# Patient Record
Sex: FEMALE | Race: AMERICAN INDIAN OR ALASKA NATIVE | NOT HISPANIC OR LATINO | Employment: UNEMPLOYED | URBAN - METROPOLITAN AREA
[De-identification: names, ages, dates, MRNs, and addresses within clinical notes are randomized per-mention and may not be internally consistent; named-entity substitution may affect disease eponyms.]

---
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
## 2025-01-07 ENCOUNTER — OFFICE VISIT (OUTPATIENT)
Dept: SURGERY | Facility: CLINIC | Age: 20
End: 2025-01-07
Payer: COMMERCIAL

## 2025-01-07 VITALS
DIASTOLIC BLOOD PRESSURE: 72 MMHG | HEIGHT: 64 IN | WEIGHT: 261.1 LBS | SYSTOLIC BLOOD PRESSURE: 118 MMHG | BODY MASS INDEX: 44.57 KG/M2

## 2025-01-07 DIAGNOSIS — E66.01 MORBID OBESITY (H): Primary | ICD-10-CM

## 2025-01-07 DIAGNOSIS — M54.9 BACK PAIN, UNSPECIFIED BACK LOCATION, UNSPECIFIED BACK PAIN LATERALITY, UNSPECIFIED CHRONICITY: ICD-10-CM

## 2025-01-07 DIAGNOSIS — R53.83 FATIGUE, UNSPECIFIED TYPE: ICD-10-CM

## 2025-01-07 PROCEDURE — 99213 OFFICE O/P EST LOW 20 MIN: CPT | Performed by: FAMILY MEDICINE

## 2025-01-07 RX ORDER — SEMAGLUTIDE 0.5 MG/.5ML
0.5 INJECTION, SOLUTION SUBCUTANEOUS
COMMUNITY
Start: 2024-12-06

## 2025-01-07 NOTE — PATIENT INSTRUCTIONS
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
## 2025-01-13 ENCOUNTER — MYC MEDICAL ADVICE (OUTPATIENT)
Dept: FAMILY MEDICINE | Facility: CLINIC | Age: 20
End: 2025-01-13
Payer: COMMERCIAL

## 2025-01-22 ENCOUNTER — OFFICE VISIT (OUTPATIENT)
Dept: FAMILY MEDICINE | Facility: CLINIC | Age: 20
End: 2025-01-22
Payer: COMMERCIAL

## 2025-01-22 VITALS
OXYGEN SATURATION: 100 % | RESPIRATION RATE: 16 BRPM | HEART RATE: 65 BPM | SYSTOLIC BLOOD PRESSURE: 136 MMHG | TEMPERATURE: 97.2 F | WEIGHT: 253 LBS | BODY MASS INDEX: 43.19 KG/M2 | HEIGHT: 64 IN | DIASTOLIC BLOOD PRESSURE: 88 MMHG

## 2025-01-22 DIAGNOSIS — L21.9 SEBORRHEIC DERMATITIS: Primary | ICD-10-CM

## 2025-01-22 PROCEDURE — 99213 OFFICE O/P EST LOW 20 MIN: CPT | Performed by: NURSE PRACTITIONER

## 2025-01-22 RX ORDER — FLUOCINONIDE 0.5 MG/G
CREAM TOPICAL DAILY
Qty: 60 G | Refills: 2 | Status: CANCELLED | OUTPATIENT
Start: 2025-01-22 | End: 2025-02-21

## 2025-01-22 ASSESSMENT — PAIN SCALES - GENERAL: PAINLEVEL_OUTOF10: MILD PAIN (3)

## 2025-01-22 NOTE — PROGRESS NOTES
Assessment & Plan     Seborrheic dermatitis  Continue ketoconazole shampoo twice weekly as prescribed.  Stop the triamcinolone.  Switch to fluocinonide 0.05% cream, once daily for up to 1 month.  If still no improvement, patient should send me MyChart message and I will refer her to dermatology      The risks, benefits and treatment options of prescribed medications or other treatments have been discussed with the patient. The patient verbalized their understanding and should call or follow up if no improvement or if they develop further problems.  Alicia Floreseric, CNP                    Subjective   April is a 19 year old, presenting for the following health issues:  Derm Problem (Patient reports scalp problem is spreading down her neck) and Health Maintenance (Declined vaccines today;  discuss need for Men B vaccine.)        1/22/2025     9:03 AM   Additional Questions   Roomed by Amparo SANCHEZ CMA   Accompanied by self         1/22/2025     9:03 AM   Patient Reported Additional Medications   Patient reports taking the following new medications none     HPI       Concern - scalp issue spreading  Onset: worsening down neck the last couple weeks  Description: patient reports seborrheic dermatitis on her scalp  Which is now spreading down neck  Had a bad flare up last week- seems to be getting better after using triamcinolone  Intensity: moderate  Progression of Symptoms:  improving  Accompanying Signs & Symptoms: itchy  Previous history of similar problem: yes  Precipitating factors:        Worsened by: winter weather  Alleviating factors:        Improved by: triamcinolone helps   Therapies tried and outcome: ketoconazole shampoo and triamcinolone-  worked at first, doesn't think the shampoo does much.      Wt Readings from Last 4 Encounters:   01/22/25 114.8 kg (253 lb) (>99%, Z= 2.51)*   01/07/25 118.4 kg (261 lb 1.6 oz) (>99%, Z= 2.57)*   11/15/24 118.8 kg (262 lb) (>99%, Z= 2.57)*   11/07/24 119.1 kg (262 lb 8 oz)  Addended by: PAULO MENDES on: 6/30/2021 01:25 PM     Modules accepted: Orders     "(>99%, Z= 2.57)*     * Growth percentiles are based on [facility] (Girls, 2-20 Years) data.             Review of Systems  Constitutional, HEENT, cardiovascular, pulmonary, gi and gu systems are negative, except as otherwise noted.      Objective    /88   Pulse 65   Temp 97.2  F (36.2  C) (Tympanic)   Resp 16   Ht 1.626 m (5' 4\")   Wt 114.8 kg (253 lb)   LMP 01/10/2025 (Approximate)   SpO2 100%   BMI 43.43 kg/m    Body mass index is 43.43 kg/m .  Physical Exam   GENERAL: alert and no distress  SKIN: Few erythematous patches with scale at the base of scalp near hairline.  When compared to pictures she took of the area last week, there is much improvement in erythema and size of lesions.          Signed Electronically by: RAMONE Horner CNP    "

## 2025-01-28 ENCOUNTER — NURSE TRIAGE (OUTPATIENT)
Dept: FAMILY MEDICINE | Facility: CLINIC | Age: 20
End: 2025-01-28
Payer: COMMERCIAL

## 2025-01-29 NOTE — TELEPHONE ENCOUNTER
Left verbal message on voicemail with PCP instruction below, call with any questions.      TAYLOR Kessler

## 2025-01-29 NOTE — TELEPHONE ENCOUNTER
Nurse Triage SBAR    Is this a 2nd Level Triage? YES, LICENSED PRACTITIONER REVIEW IS REQUIRED    Situation: Patient triaged due to MyChart message below.     Background: Patient is taking Minipress and finished 1 mg Wegovy.    Assessment: Patient states this happens mostly in the mornings. Patient has not checked BP during this time. Difficulty breathing due to cold/flu. Previously has happened but did not seek treatment. Patient has been drinking plenty of fluids.    Protocol Recommended Disposition:   See in Office Today    Recommendation: Offered possible ADS and in person appointment today. Patient declined as she is going to work from 1-5pm. Scheduled with PCP tomorrow morning. Advised patient if faints prior to appointment or if symptoms worsen/develop SOB to seek care at ER/UC. Patient voiced understanding.     Routed to provider    Does the patient meet one of the following criteria for ADS visit consideration? 16+ years old, with an [facility] PCP     TIP  Providers, please consider if this condition is appropriate for management at one of our Acute and Diagnostic Services sites.     If patient is a good candidate, please use dotphrase <dot>triageresponse and select Refer to ADS to document.  Reason for Disposition   All other patients, and now alert and feels fine  (Exception: SIMPLE FAINT due to stress, pain, prolonged standing, or suddenly standing.)    Additional Information   Negative: Still unconscious   Negative: Still feels dizzy or lightheaded   Negative: Difficult to awaken or acting confused (e.g., disoriented, slurred speech)   Negative: Difficulty breathing   Negative: Shock suspected (e.g., cold/pale/clammy skin, too weak to stand, low BP, rapid pulse)   Negative: Bluish (or gray) lips or face   Negative: Bleeding (e.g., vomiting blood, rectal bleeding or tarry stools, severe vaginal bleeding)   Negative: Chest pain   Negative: Extra heartbeats, irregular heart beating, or heart is beating very  "fast (i.e., 'palpitations')   Negative: Heart beating < 50 beats per minute OR > 140 beats per minute   Negative: Fainted suddenly after medicine, allergic food or bee sting   Negative: Sounds like a life-threatening emergency to the triager   Negative: Has diabetes (diabetes mellitus) and fainting from low blood glucose (70 mg/dL [3.9 mmol/L] or below)   Negative: Seizure suspected (e.g., muscle jerking or shaking followed by confusion)   Negative: Heat exhaustion suspected (i.e., dehydration from heat exposure)   Negative: Fainted > 15 minutes ago and still looks pale (pale skin, pallor)   Negative: Fainted > 15 minutes ago and still feels weak or dizzy   Negative: History of heart problems or congestive heart failure   Negative: Occurred during exercise   Negative: Any head or face injury   Negative: Age > 50 years   Negative: Drinking very little and dehydration suspected (e.g., no urine > 12 hours, very dry mouth, very lightheaded)   Negative: Fainted 2 times in one day   Negative: Patient sounds very sick or weak to the triager    Answer Assessment - Initial Assessment Questions  1. ONSET: \"How long were you unconscious?\" (e.g., minutes, seconds) \"When did it happen?\"      A couple minutes yesterday  2. CONTENT: \"What happened during the period of unconsciousness?\" (e.g., seizure activity)       Not that I know of  3. MENTAL STATUS: \"Alert and oriented now?\" (e.g., oriented x 3 = name, month, location)       Yes  4. TRIGGER: \"What do you think caused the fainting?\" \"What were you doing just before you fainted?\"  (e.g., exercise, sudden standing up, prolonged standing)      Unsure, has been sick with the flu  5. RECURRENT SYMPTOM: \"Have you ever passed out before?\" If Yes, ask: \"When was the last time?\" and \"What happened that time?\"       Yes, a couple years ago. Same symptoms. Did not seek treatment  6. INJURY: \"Did you hurt yourself when you fell?\"       Hit shoulder  7. CARDIAC SYMPTOMS: \"Have you had any " "of the following symptoms: chest pain, difficulty breathing, palpitations?\"      Mild difficulty breathing due to cold/flu  8. NEUROLOGIC SYMPTOMS: \"Have you had any of the following symptoms: headache, numbness, vertigo, weakness?\"      Weakness  9. GI SYMPTOMS: \"Have you had any of the following symptoms: abdomen pain, vomiting, diarrhea, blood in stools?\"      Diarrhea yesterday  10. OTHER SYMPTOMS: \"Do you have any other symptoms?\"        No  11. PREGNANCY: \"Is there any chance you are pregnant?\" \"When was your last menstrual period?\"        No, January 10 LMP    Protocols used: Tucker OATES RN  [facility]    "

## 2025-01-29 NOTE — TELEPHONE ENCOUNTER
Agree with advice given.    Encourage her to stay hydrated.  She should have one electrolyte drink daily    Alicia Pichardo, CNP

## 2025-01-30 ENCOUNTER — OFFICE VISIT (OUTPATIENT)
Dept: FAMILY MEDICINE | Facility: CLINIC | Age: 20
End: 2025-01-30
Payer: COMMERCIAL

## 2025-01-30 VITALS
BODY MASS INDEX: 42.17 KG/M2 | RESPIRATION RATE: 16 BRPM | DIASTOLIC BLOOD PRESSURE: 86 MMHG | OXYGEN SATURATION: 98 % | TEMPERATURE: 97.8 F | HEART RATE: 80 BPM | WEIGHT: 247 LBS | HEIGHT: 64 IN | SYSTOLIC BLOOD PRESSURE: 136 MMHG

## 2025-01-30 DIAGNOSIS — F51.01 PRIMARY INSOMNIA: ICD-10-CM

## 2025-01-30 DIAGNOSIS — Z11.4 SCREENING FOR HIV (HUMAN IMMUNODEFICIENCY VIRUS): ICD-10-CM

## 2025-01-30 DIAGNOSIS — R42 DIZZINESS: Primary | ICD-10-CM

## 2025-01-30 DIAGNOSIS — Z11.59 NEED FOR HEPATITIS C SCREENING TEST: ICD-10-CM

## 2025-01-30 LAB
ALBUMIN SERPL BCG-MCNC: 4.4 G/DL (ref 3.5–5.2)
ALP SERPL-CCNC: 110 U/L (ref 40–150)
ALT SERPL W P-5'-P-CCNC: 91 U/L (ref 0–50)
ANION GAP SERPL CALCULATED.3IONS-SCNC: 11 MMOL/L (ref 7–15)
AST SERPL W P-5'-P-CCNC: 81 U/L (ref 0–35)
BILIRUB SERPL-MCNC: 0.2 MG/DL
BUN SERPL-MCNC: 7.6 MG/DL (ref 6–20)
CALCIUM SERPL-MCNC: 9.7 MG/DL (ref 8.8–10.4)
CHLORIDE SERPL-SCNC: 103 MMOL/L (ref 98–107)
CREAT SERPL-MCNC: 0.68 MG/DL (ref 0.51–0.95)
EGFRCR SERPLBLD CKD-EPI 2021: >90 ML/MIN/1.73M2
ERYTHROCYTE [DISTWIDTH] IN BLOOD BY AUTOMATED COUNT: 16.8 % (ref 10–15)
EST. AVERAGE GLUCOSE BLD GHB EST-MCNC: 114 MG/DL
GLUCOSE SERPL-MCNC: 84 MG/DL (ref 70–99)
HBA1C MFR BLD: 5.6 % (ref 0–5.6)
HCO3 SERPL-SCNC: 24 MMOL/L (ref 22–29)
HCT VFR BLD AUTO: 38.2 % (ref 35–47)
HCV AB SERPL QL IA: NONREACTIVE
HGB BLD-MCNC: 12 G/DL (ref 11.7–15.7)
HIV 1+2 AB+HIV1 P24 AG SERPL QL IA: NONREACTIVE
MCH RBC QN AUTO: 23.9 PG (ref 26.5–33)
MCHC RBC AUTO-ENTMCNC: 31.4 G/DL (ref 31.5–36.5)
MCV RBC AUTO: 76 FL (ref 78–100)
PLATELET # BLD AUTO: 272 10E3/UL (ref 150–450)
POTASSIUM SERPL-SCNC: 4.3 MMOL/L (ref 3.4–5.3)
PROT SERPL-MCNC: 7.4 G/DL (ref 6.4–8.3)
RBC # BLD AUTO: 5.03 10E6/UL (ref 3.8–5.2)
SODIUM SERPL-SCNC: 138 MMOL/L (ref 135–145)
TSH SERPL DL<=0.005 MIU/L-ACNC: 1.53 UIU/ML (ref 0.5–4.3)
WBC # BLD AUTO: 3.3 10E3/UL (ref 4–11)

## 2025-01-30 RX ORDER — TRAZODONE HYDROCHLORIDE 50 MG/1
50 TABLET, FILM COATED ORAL AT BEDTIME
Qty: 30 TABLET | Refills: 1 | Status: SHIPPED | OUTPATIENT
Start: 2025-01-30

## 2025-01-30 ASSESSMENT — PAIN SCALES - GENERAL: PAINLEVEL_OUTOF10: NO PAIN (0)

## 2025-01-30 ASSESSMENT — ENCOUNTER SYMPTOMS: SYNCOPE: 1

## 2025-02-27 ENCOUNTER — MYC MEDICAL ADVICE (OUTPATIENT)
Dept: FAMILY MEDICINE | Facility: CLINIC | Age: 20
End: 2025-02-27
Payer: COMMERCIAL

## 2025-03-01 ENCOUNTER — MYC REFILL (OUTPATIENT)
Dept: FAMILY MEDICINE | Facility: CLINIC | Age: 20
End: 2025-03-01
Payer: COMMERCIAL

## 2025-03-01 DIAGNOSIS — F51.01 PRIMARY INSOMNIA: ICD-10-CM

## 2025-03-02 ENCOUNTER — HEALTH MAINTENANCE LETTER (OUTPATIENT)
Age: 20
End: 2025-03-02

## 2025-03-03 RX ORDER — TRAZODONE HYDROCHLORIDE 50 MG/1
50 TABLET ORAL AT BEDTIME
Qty: 30 TABLET | Refills: 1 | OUTPATIENT
Start: 2025-03-03
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
## 2025-03-05 ENCOUNTER — VIRTUAL VISIT (OUTPATIENT)
Dept: SURGERY | Facility: CLINIC | Age: 20
End: 2025-03-05
Payer: COMMERCIAL

## 2025-03-05 DIAGNOSIS — E66.01 MORBID OBESITY WITH BMI OF 40.0-44.9, ADULT (H): Primary | ICD-10-CM

## 2025-03-05 DIAGNOSIS — Z71.3 NUTRITIONAL COUNSELING: ICD-10-CM

## 2025-03-05 PROCEDURE — 97803 MED NUTRITION INDIV SUBSEQ: CPT | Mod: 95 | Performed by: DIETITIAN, REGISTERED
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
## 2025-03-10 ENCOUNTER — VIRTUAL VISIT (OUTPATIENT)
Dept: FAMILY MEDICINE | Facility: CLINIC | Age: 20
End: 2025-03-10
Payer: COMMERCIAL

## 2025-03-10 DIAGNOSIS — F19.10 SUBSTANCE ABUSE (H): ICD-10-CM

## 2025-03-10 DIAGNOSIS — F51.01 PRIMARY INSOMNIA: Primary | ICD-10-CM

## 2025-03-10 PROCEDURE — 1126F AMNT PAIN NOTED NONE PRSNT: CPT | Mod: 95 | Performed by: NURSE PRACTITIONER

## 2025-03-10 PROCEDURE — 98005 SYNCH AUDIO-VIDEO EST LOW 20: CPT | Performed by: NURSE PRACTITIONER

## 2025-03-10 RX ORDER — TRAZODONE HYDROCHLORIDE 100 MG/1
100 TABLET ORAL AT BEDTIME
Qty: 30 TABLET | Refills: 5 | Status: SHIPPED | OUTPATIENT
Start: 2025-03-10
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
## 2025-04-26 ENCOUNTER — MYC REFILL (OUTPATIENT)
Dept: FAMILY MEDICINE | Facility: CLINIC | Age: 20
End: 2025-04-26
Payer: COMMERCIAL

## 2025-04-26 DIAGNOSIS — L21.9 SEBORRHEIC DERMATITIS: ICD-10-CM

## 2025-04-26 DIAGNOSIS — F51.01 PRIMARY INSOMNIA: ICD-10-CM

## 2025-04-28 RX ORDER — TRAZODONE HYDROCHLORIDE 100 MG/1
100 TABLET ORAL AT BEDTIME
Qty: 30 TABLET | Refills: 5 | OUTPATIENT
Start: 2025-04-28

## 2025-04-28 RX ORDER — KETOCONAZOLE 20 MG/ML
SHAMPOO, SUSPENSION TOPICAL
Qty: 120 ML | Refills: 1 | Status: SHIPPED | OUTPATIENT
Start: 2025-04-28

## 2025-04-29 ENCOUNTER — OFFICE VISIT (OUTPATIENT)
Dept: SURGERY | Facility: CLINIC | Age: 20
End: 2025-04-29
Payer: COMMERCIAL

## 2025-04-29 VITALS
HEIGHT: 64 IN | SYSTOLIC BLOOD PRESSURE: 120 MMHG | DIASTOLIC BLOOD PRESSURE: 78 MMHG | BODY MASS INDEX: 40.55 KG/M2 | WEIGHT: 237.5 LBS

## 2025-04-29 DIAGNOSIS — E66.01 MORBID OBESITY (H): ICD-10-CM

## 2025-04-29 DIAGNOSIS — R53.83 FATIGUE, UNSPECIFIED TYPE: ICD-10-CM

## 2025-04-29 DIAGNOSIS — E66.01 MORBID OBESITY WITH BMI OF 40.0-44.9, ADULT (H): Primary | ICD-10-CM

## 2025-04-29 DIAGNOSIS — M54.9 BACK PAIN, UNSPECIFIED BACK LOCATION, UNSPECIFIED BACK PAIN LATERALITY, UNSPECIFIED CHRONICITY: ICD-10-CM

## 2025-04-29 PROCEDURE — 99213 OFFICE O/P EST LOW 20 MIN: CPT | Performed by: FAMILY MEDICINE

## 2025-04-29 PROCEDURE — G2211 COMPLEX E/M VISIT ADD ON: HCPCS | Performed by: FAMILY MEDICINE

## 2025-04-29 PROCEDURE — 3074F SYST BP LT 130 MM HG: CPT | Performed by: FAMILY MEDICINE

## 2025-04-29 PROCEDURE — 3078F DIAST BP <80 MM HG: CPT | Performed by: FAMILY MEDICINE
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
## 2025-05-05 ENCOUNTER — MYC MEDICAL ADVICE (OUTPATIENT)
Dept: FAMILY MEDICINE | Facility: CLINIC | Age: 20
End: 2025-05-05
Payer: COMMERCIAL

## 2025-05-05 DIAGNOSIS — L30.9 DERMATITIS: Primary | ICD-10-CM

## 2025-05-07 ENCOUNTER — PATIENT OUTREACH (OUTPATIENT)
Dept: CARE COORDINATION | Facility: CLINIC | Age: 20
End: 2025-05-07
Payer: COMMERCIAL

## 2025-07-06 ENCOUNTER — OFFICE VISIT (OUTPATIENT)
Dept: URGENT CARE | Facility: URGENT CARE | Age: 20
End: 2025-07-06
Payer: COMMERCIAL

## 2025-07-06 VITALS
SYSTOLIC BLOOD PRESSURE: 111 MMHG | WEIGHT: 230 LBS | HEART RATE: 88 BPM | BODY MASS INDEX: 39.48 KG/M2 | RESPIRATION RATE: 15 BRPM | DIASTOLIC BLOOD PRESSURE: 76 MMHG | TEMPERATURE: 97.7 F | OXYGEN SATURATION: 100 %

## 2025-07-06 DIAGNOSIS — H10.33 ACUTE BACTERIAL CONJUNCTIVITIS OF BOTH EYES: Primary | ICD-10-CM

## 2025-07-06 PROCEDURE — 3074F SYST BP LT 130 MM HG: CPT | Performed by: EMERGENCY MEDICINE

## 2025-07-06 PROCEDURE — 3078F DIAST BP <80 MM HG: CPT | Performed by: EMERGENCY MEDICINE

## 2025-07-06 PROCEDURE — 99213 OFFICE O/P EST LOW 20 MIN: CPT | Performed by: EMERGENCY MEDICINE

## 2025-07-06 RX ORDER — OFLOXACIN 3 MG/ML
1-2 SOLUTION/ DROPS OPHTHALMIC
Qty: 5 ML | Refills: 0 | Status: SHIPPED | OUTPATIENT
Start: 2025-07-06 | End: 2025-07-13

## 2025-07-06 RX ORDER — KETOROLAC TROMETHAMINE 5 MG/ML
1 SOLUTION OPHTHALMIC 4 TIMES DAILY
Qty: 5 ML | Refills: 0 | Status: SHIPPED | OUTPATIENT
Start: 2025-07-06 | End: 2025-07-13

## 2025-07-06 NOTE — LETTER
July 6, 2025      Chandrika Marie  [address] [facility] [id number]        To Whom It May Concern:    Chandrika Marie  was seen on 7/6/25.  Please excuse her  until 7/7/25, due to eye infection. No longer contagious tomorrow.        Sincerely,        Huber Sandoval MD    Electronically signed

## 2025-07-06 NOTE — PATIENT INSTRUCTIONS
Ofloxacin-antibiotic drops  Acular, antiirritation drops  Contagious for 24 hours  Recheck 4 to 5 days if symptoms persist, sooner if worse

## 2025-07-06 NOTE — PROGRESS NOTES
Urgent Care Clinic Visit    Chief Complaint   Patient presents with    Eye Problem     Both eyes are red and irritated after swimming in [employer] 3 days ago.  Pt has been using saline drops that don't help. Pt has itching and burning.                11/18/2022   Vision Screen   Vision Screen Results Pass    Pass    Pass   No Corrective Lenses, PLUS LENS REQUIRED Pass    Pass    Pass       Multiple values from one day are sorted in reverse-chronological order         7/6/2025     9:11 AM   Additional Questions   Roomed by Perla BENNETT         7/6/2025   Forms   Any forms needing to be completed Yes

## 2025-07-06 NOTE — PROGRESS NOTES
CHIEF COMPLAINT: Eye irritation.      HPI: Patient is a 19-year-old female who developed eye irritation after swimming for 5 days ago.  Both eyes bother her.  They feel itchy.  She has had progressive worsening of crusty exudate.  She has no chronic eye disease.  Does not wear contact lenses.  No injury.      ROS: See HPI otherwise normal.    Allergies   Allergen Reactions    Amoxil [Amoxicillin] Rash      Current Outpatient Medications   Medication Sig Dispense Refill    docusate sodium (COLACE) 100 MG capsule Take 1 capsule (100 mg) by mouth 2 times daily as needed for constipation. 90 capsule 3    fluocinonide (LIDEX) 0.05 % external cream Apply topically 2 times daily 30 g 1    ketoconazole (NIZORAL) 2 % external shampoo Apply to scalp twice weekly. Leave on for 5 minutes and then rinse 120 mL 1    ketorolac (ACULAR) 0.5 % ophthalmic solution Place 1 drop into both eyes 4 times daily for 7 days. 5 mL 0    ofloxacin (OCUFLOX) 0.3 % ophthalmic solution Place 1-2 drops into both eyes every 2 hours (while awake) for 7 days. 5 mL 0    Semaglutide-Weight Management (WEGOVY) 1.7 MG/0.75ML pen Inject 1.7 mg subcutaneously once a week. 3 mL 2    traZODone (DESYREL) 100 MG tablet Take 1 tablet (100 mg) by mouth at bedtime. 30 tablet 5    triamcinolone (KENALOG) 0.1 % external ointment Apply topically 2 times daily. 30 g 11         PE: No acute distress.  Examination of her eyes reveal both eyes to be mildly injected.  She has got no foreign body.  No sandeep crusty exudate seen.  PERRLA EOMI.  Periorbital region reveals no cellulitic changes.        TREATMENT: None.      ASSESSMENT: Conjunctivitis, most likely bacterial.  Will cover for allergic symptoms as well      DIAGNOSIS: Conjunctivitis both eyes.      PLAN: Ofloxacin drops.  Acular drops.  Recheck 4 to 5 days if no better, sooner if worse

## 2025-08-14 ENCOUNTER — OFFICE VISIT (OUTPATIENT)
Dept: FAMILY MEDICINE | Facility: CLINIC | Age: 20
End: 2025-08-14
Payer: COMMERCIAL

## 2025-08-14 VITALS
SYSTOLIC BLOOD PRESSURE: 126 MMHG | RESPIRATION RATE: 16 BRPM | DIASTOLIC BLOOD PRESSURE: 70 MMHG | HEIGHT: 64 IN | HEART RATE: 80 BPM | TEMPERATURE: 98.4 F | WEIGHT: 230 LBS | OXYGEN SATURATION: 99 % | BODY MASS INDEX: 39.27 KG/M2

## 2025-08-14 DIAGNOSIS — Z00.00 ROUTINE GENERAL MEDICAL EXAMINATION AT A HEALTH CARE FACILITY: Primary | ICD-10-CM

## 2025-08-14 SDOH — HEALTH STABILITY: PHYSICAL HEALTH: ON AVERAGE, HOW MANY DAYS PER WEEK DO YOU ENGAGE IN MODERATE TO STRENUOUS EXERCISE (LIKE A BRISK WALK)?: 5 DAYS

## 2025-08-14 ASSESSMENT — SOCIAL DETERMINANTS OF HEALTH (SDOH): HOW OFTEN DO YOU GET TOGETHER WITH FRIENDS OR RELATIVES?: MORE THAN THREE TIMES A WEEK

## 2025-08-14 ASSESSMENT — PATIENT HEALTH QUESTIONNAIRE - PHQ9: SUM OF ALL RESPONSES TO PHQ QUESTIONS 1-9: 14

## 2025-08-14 ASSESSMENT — PAIN SCALES - GENERAL: PAINLEVEL_OUTOF10: NO PAIN (0)

## 2025-08-20 ENCOUNTER — MYC MEDICAL ADVICE (OUTPATIENT)
Dept: SURGERY | Facility: CLINIC | Age: 20
End: 2025-08-20
Payer: COMMERCIAL

## 2025-08-20 DIAGNOSIS — E66.9 OBESITY (BMI 30-39.9): ICD-10-CM

## 2025-08-20 DIAGNOSIS — E66.01 MORBID OBESITY WITH BMI OF 40.0-44.9, ADULT (H): Primary | ICD-10-CM
